# Patient Record
Sex: FEMALE | Race: ASIAN | NOT HISPANIC OR LATINO | URBAN - METROPOLITAN AREA
[De-identification: names, ages, dates, MRNs, and addresses within clinical notes are randomized per-mention and may not be internally consistent; named-entity substitution may affect disease eponyms.]

---

## 2021-01-11 ENCOUNTER — OUTPATIENT (OUTPATIENT)
Dept: OUTPATIENT SERVICES | Facility: HOSPITAL | Age: 61
LOS: 1 days | End: 2021-01-11
Payer: COMMERCIAL

## 2021-01-11 DIAGNOSIS — Z20.828 CONTACT WITH AND (SUSPECTED) EXPOSURE TO OTHER VIRAL COMMUNICABLE DISEASES: ICD-10-CM

## 2021-01-11 LAB — SARS-COV-2 RNA SPEC QL NAA+PROBE: DETECTED

## 2021-01-11 PROCEDURE — U0003: CPT

## 2021-01-11 PROCEDURE — U0005: CPT

## 2021-01-11 PROCEDURE — C9803: CPT

## 2021-01-12 DIAGNOSIS — Z20.828 CONTACT WITH AND (SUSPECTED) EXPOSURE TO OTHER VIRAL COMMUNICABLE DISEASES: ICD-10-CM

## 2021-04-05 ENCOUNTER — OUTPATIENT (OUTPATIENT)
Dept: OUTPATIENT SERVICES | Facility: HOSPITAL | Age: 61
LOS: 1 days | End: 2021-04-05
Payer: COMMERCIAL

## 2021-04-05 DIAGNOSIS — Z20.828 CONTACT WITH AND (SUSPECTED) EXPOSURE TO OTHER VIRAL COMMUNICABLE DISEASES: ICD-10-CM

## 2021-04-05 LAB — SARS-COV-2 RNA SPEC QL NAA+PROBE: SIGNIFICANT CHANGE UP

## 2021-04-05 PROCEDURE — C9803: CPT

## 2021-04-05 PROCEDURE — U0003: CPT

## 2021-04-05 PROCEDURE — U0005: CPT

## 2021-04-06 DIAGNOSIS — Z20.828 CONTACT WITH AND (SUSPECTED) EXPOSURE TO OTHER VIRAL COMMUNICABLE DISEASES: ICD-10-CM

## 2021-05-01 ENCOUNTER — OUTPATIENT (OUTPATIENT)
Dept: OUTPATIENT SERVICES | Facility: HOSPITAL | Age: 61
LOS: 1 days | End: 2021-05-01
Payer: COMMERCIAL

## 2021-05-01 DIAGNOSIS — Z20.828 CONTACT WITH AND (SUSPECTED) EXPOSURE TO OTHER VIRAL COMMUNICABLE DISEASES: ICD-10-CM

## 2021-05-01 LAB — SARS-COV-2 RNA SPEC QL NAA+PROBE: SIGNIFICANT CHANGE UP

## 2021-05-01 PROCEDURE — U0003: CPT

## 2021-05-01 PROCEDURE — U0005: CPT

## 2021-05-01 PROCEDURE — C9803: CPT

## 2021-05-02 DIAGNOSIS — Z20.828 CONTACT WITH AND (SUSPECTED) EXPOSURE TO OTHER VIRAL COMMUNICABLE DISEASES: ICD-10-CM

## 2021-07-14 PROBLEM — Z00.00 ENCOUNTER FOR PREVENTIVE HEALTH EXAMINATION: Status: ACTIVE | Noted: 2021-07-14

## 2021-07-15 ENCOUNTER — APPOINTMENT (OUTPATIENT)
Dept: ORTHOPEDIC SURGERY | Facility: CLINIC | Age: 61
End: 2021-07-15
Payer: COMMERCIAL

## 2021-07-15 ENCOUNTER — NON-APPOINTMENT (OUTPATIENT)
Age: 61
End: 2021-07-15

## 2021-07-15 DIAGNOSIS — M65.4 RADIAL STYLOID TENOSYNOVITIS [DE QUERVAIN]: ICD-10-CM

## 2021-07-15 DIAGNOSIS — M25.512 PAIN IN LEFT SHOULDER: ICD-10-CM

## 2021-07-15 PROCEDURE — 73030 X-RAY EXAM OF SHOULDER: CPT | Mod: LT

## 2021-07-15 PROCEDURE — 99203 OFFICE O/P NEW LOW 30 MIN: CPT

## 2021-07-15 NOTE — END OF VISIT
[FreeTextEntry3] : All medical record entries made by the Scribe were at my,  Dr. Misael Knapp MD., direction and personally dictated by me on 07/15/2021. I have personally reviewed the chart and agree that the record accurately reflects my personal performance of the history, physical exam, assessment and plan.\par \par

## 2021-07-15 NOTE — DISCUSSION/SUMMARY
[FreeTextEntry1] : The underlying pathophysiology was reviewed with the patient. XR films were reviewed with the patient. Discussed at length the nature of the patient’s condition. The left wrist symptoms appear secondary to De Quervain's tendonitis.\par \par The patient wishes to proceed with physical therapy of the left shoulder and left wrist. A script was given.		 \par Continue with use of wrist brace for De Quervain's\par Continue with use of antiinflammatories such as Naproxen\par A cortisone injection into the left wrist is not indicated at this time due to the patient receiving a cortisone injection 2 weeks ago.\par \par Patient can continue activities as tolerated. All questions answered, understanding verbalized. Patient in agreement with plan of care. Follow up in 6-8 weeks.

## 2021-07-15 NOTE — PHYSICAL EXAM
[de-identified] : Patient is WDWN, alert, and in no acute distress. Breathing is unlabored. She is grossly oriented to person, place, \par and time.\par \par She is accompanied by her son today.\par \par Left Wrist: \par There is tenderness over the first dorsal compartment at the level of the radial styloid. Swelling is localized to the area. There are no visible deformities. The ROM is full in all planes with no crepitus. There is no joint instability on provocative testing. Sensation is normal.\par Range of Motion: Pain is elicited with resisted radial deviation of the wrist. \par Tests/Signs: Finkelstein's test is positive.\par \par Left Shoulder: \par Inspection/ Palpation: there is no tenderness, swelling, or deformities. \par Range of Motion: active flexion, passive flexion, abduction, external rotation is full with discomfort\par Stability: no joint instability on provocative testing. 		 		  [de-identified] : AP, transcapula, and axillary views of the left shoulder were obtained and revealed no abnormalities. No acute fracture. No dislocation. Cartilage spaces are maintained. \par \par XRs of the left wrist were reviewed from April 2021 - Mild arthritis is present.

## 2021-07-15 NOTE — ADDENDUM
[FreeTextEntry1] : I, Adali Castillo wrote this note acting as a scribe for Dr. Misael Knapp on Jul 15, 2021.\par \par

## 2021-07-15 NOTE — HISTORY OF PRESENT ILLNESS
[Right] : right hand dominant [FreeTextEntry1] : Pt is a 61 y/o female c/o left wrist and left shoulder pain.  The left wrist has been painful for 4 months.  Denies injury.  She has pain with lifting or twisting.  She cannot open a jar without pain.  It is tender to touch.  She has swelling in the radial portion of the wrist.  She was seen by another orthopedist two weeks ago who gave her a cortisone injection.  She states that the pain is 60% better.  She is wearing a thumb spica brace which helps a little. She also notes that she had an xray by a physician in [place] which was WNL.\par \par She also c/o left shoulder pain x 4 months.  She has pain with overhead motion.  It is painful to lay on her side.  She has difficult lifting anything heavy.  She has not been seen by anyone for the shoulder pain.

## 2022-01-08 ENCOUNTER — OUTPATIENT (OUTPATIENT)
Dept: OUTPATIENT SERVICES | Facility: HOSPITAL | Age: 62
LOS: 1 days | End: 2022-01-08
Payer: COMMERCIAL

## 2022-01-08 DIAGNOSIS — Z20.828 CONTACT WITH AND (SUSPECTED) EXPOSURE TO OTHER VIRAL COMMUNICABLE DISEASES: ICD-10-CM

## 2022-01-08 LAB — SARS-COV-2 RNA SPEC QL NAA+PROBE: SIGNIFICANT CHANGE UP

## 2022-01-08 PROCEDURE — U0003: CPT

## 2022-01-08 PROCEDURE — U0005: CPT

## 2022-01-08 PROCEDURE — C9803: CPT

## 2022-01-09 DIAGNOSIS — Z20.828 CONTACT WITH AND (SUSPECTED) EXPOSURE TO OTHER VIRAL COMMUNICABLE DISEASES: ICD-10-CM

## 2023-05-25 ENCOUNTER — NON-APPOINTMENT (OUTPATIENT)
Age: 63
End: 2023-05-25

## 2023-05-25 ENCOUNTER — APPOINTMENT (OUTPATIENT)
Dept: ORTHOPEDIC SURGERY | Facility: CLINIC | Age: 63
End: 2023-05-25
Payer: COMMERCIAL

## 2023-05-25 VITALS — BODY MASS INDEX: 22.66 KG/M2 | HEIGHT: 61 IN | WEIGHT: 120 LBS

## 2023-05-25 DIAGNOSIS — M19.072 PRIMARY OSTEOARTHRITIS, LEFT ANKLE AND FOOT: ICD-10-CM

## 2023-05-25 DIAGNOSIS — M25.572 PAIN IN LEFT ANKLE AND JOINTS OF LEFT FOOT: ICD-10-CM

## 2023-05-25 PROCEDURE — 99214 OFFICE O/P EST MOD 30 MIN: CPT

## 2023-05-25 PROCEDURE — 73610 X-RAY EXAM OF ANKLE: CPT | Mod: LT

## 2023-05-25 NOTE — DISCUSSION/SUMMARY
[de-identified] : Today I had a lengthy discussion with the patient regarding their left ankle pain. I have addressed all the patient's concerns surrounding the pathology of their condition. I have reviewed the patient's XR imaging with them in great detail. \par \par I recommend the patient undergo a course of physical therapy for the left ankle  2-3 times a week for a total of 8-12 weeks. A prescription was given for the physical therapy today. I also recommend the patient use an OTC ankle sleeve. I recommend that the patient utilize Voltaren gel topically. If the Voltaren gel could not be obtained, Icy Hot, Biofreeze, or Bengay can be utilized instead. \par \par The patient understood and verbally agreed to the treatment plan. All of their questions were answered and they were satisfied with the visit. The patient should call the office if they have any questions or experience worsening symptoms. \par \par Follow up in 2-3 months.

## 2023-05-25 NOTE — PHYSICAL EXAM
[de-identified] : General: Alert and oriented x3. In no acute distress. Pleasant in nature with a normal affect. No apparent respiratory distress.\par \par Left Ankle Exam\par \par Skin: Clean, dry, intact\par Inspection: No obvious malalignment, no swelling, no effusion; no lymphadenopathy\par Pulses: 2+ DP/PT pulses\par ROM: Left Ankle 10 degrees of dorsiflexion, 40 degrees of plantarflexion, 35 degrees of Eversion, 35 degrees of Inversion; 10 degrees of subtalar motion.\par Tenderness: + Talonavicular Joint. No tenderness over the lateral malleolus, no CFL/no ATFL/no PTFL pain. No medial malleolus pain, no deltoid ligament pain. No proximal fibular pain. No heel pain.\par Stability: Negative anterior/posterior drawer.\par Strength: 5/5 TA/GS/EHL\par Neuro: In tact to light touch throughout\par Additional tests: Negative Mortons test, Negative syndesmosis squeeze test. Negative Hugh's Sign.\par  [de-identified] : 3 views x-rays left ankle reviewed, 5/25/2023: arthritis, talonavicular arthritis.

## 2023-05-25 NOTE — HISTORY OF PRESENT ILLNESS
[FreeTextEntry1] : Adrien Dale is a 62 year old female who presents to the office for an initial evaluation of her left ankle. She is accompanied by her . She states that she has been experiencing symptoms for the past 5 months. There was no trauma to the area. The patient states that her symptoms increase when she is on her feet for prolonged periods of time. She endorses anterior ankle pain. She presents today wearing an ankle brace with sneakers and is ambulating without assistance. No other complaints.

## 2023-05-25 NOTE — ADDENDUM
[FreeTextEntry1] : I, LEENA STEIN, acted solely as a scribe for Dr. Efren Best on this date 05/25/2023  .\par  \par All medical record entries made by the Scribe were at my, Dr. Efren Best, direction and personally dictated by me on 05/25/2023 . I have reviewed the chart and agree that the record accurately reflects my personal performance of the history, physical exam, assessment and plan. I have also personally directed, reviewed, and agreed with the chart.

## 2023-07-31 ENCOUNTER — APPOINTMENT (OUTPATIENT)
Dept: ORTHOPEDIC SURGERY | Facility: CLINIC | Age: 63
End: 2023-07-31

## 2024-05-31 ENCOUNTER — APPOINTMENT (OUTPATIENT)
Dept: ORTHOPEDIC SURGERY | Facility: CLINIC | Age: 64
End: 2024-05-31
Payer: COMMERCIAL

## 2024-05-31 VITALS
BODY MASS INDEX: 22.66 KG/M2 | DIASTOLIC BLOOD PRESSURE: 81 MMHG | SYSTOLIC BLOOD PRESSURE: 147 MMHG | WEIGHT: 120 LBS | HEIGHT: 61 IN | HEART RATE: 81 BPM

## 2024-05-31 DIAGNOSIS — M25.552 PAIN IN LEFT HIP: ICD-10-CM

## 2024-05-31 DIAGNOSIS — M25.561 PAIN IN RIGHT KNEE: ICD-10-CM

## 2024-05-31 PROCEDURE — 99204 OFFICE O/P NEW MOD 45 MIN: CPT

## 2024-05-31 PROCEDURE — 72100 X-RAY EXAM L-S SPINE 2/3 VWS: CPT

## 2024-05-31 PROCEDURE — 73564 X-RAY EXAM KNEE 4 OR MORE: CPT | Mod: RT

## 2024-05-31 PROCEDURE — 72170 X-RAY EXAM OF PELVIS: CPT

## 2024-05-31 NOTE — DISCUSSION/SUMMARY
[de-identified] : Lumbar radiculopathy, right knee severe osteoarthritis  Extensive discussion of the natural history of this disease was had with the patient.  We discussed the treatment options ranging from conservative therapy which includes anti-inflammatories, steroid/HA injections, physical therapy, weight loss, knee sleeves/braces, and activity modification.  We did discuss that the ultimate treatment for arthritis is a joint replacement.  However at this time we have decided to continue with conservative treatment.   We did discuss red flag symptoms and patient should report immediately to the emergency department if she experiences any of these. I would like an MRI of her lumbar spine as this has been a chronic problem and has failed conservative treatment.  She will follow-up with PM&R once the MRI is complete. We did discuss injections for the right knee however she would like to focus on the back at this time. Patient will follow-up as needed if the pain returns or does not improve.  The patient's current medication management of their orthopedic diagnosis was reviewed today: (1) We discussed a comprehensive treatment plan that included possible pharmaceutical management involving the use of prescription strength medications including but not limited to options such as oral Ibuprofen 400mg QID, once daily Meloxicam 15 mg, or 500-650 mg Tylenol versus over the counter oral medications and topical prescription NSAID Pennsaid vs over the counter Voltaren gel. (2) There is a moderate risk of morbidity with further treatment, especially from use of prescription strength medications and possible side effects of these medications which include upset stomach with oral medications, skin reactions to topical medications and cardiac/renal issues with long term use. (3) I recommended that the patient follow-up with their medical physician to discuss any significant specific potential issues with long term medication use such as interactions with current medications or with exacerbation of underlying medical comorbidities. (4) The benefits and risks associated with use of injectable, oral or topical, prescription and over the counter anti-inflammatory medications were discussed with the patient. The patient voiced understanding of the risks including but not limited to bleeding, stroke, kidney dysfunction, heart disease, and were referred to the black box warning label for further information.  Stage 1 pressure injury on the back  - Likely 2/2 immobility  - PT consult

## 2024-05-31 NOTE — PHYSICAL EXAM
[de-identified] : Lumbar spine Palpation: Nontender palpation Motor: 5/5 L2-S1 Sensory: 2/2 L2-S1 Straight leg raise: Mildly positive on right Clonus: < 5 beats  Right lower extremity Hip: Normal ROM without pain on IR/ER Knee Inspection: no effusion Wounds: None Alignment: Neutral Palpation: tender to palpation at medial joint line ROM active (in degrees): 0-140 with crepitus/pain through the arc of motion Ligamentous laxity: all ligaments appear stable, stable to varus stress test, stable to valgus stress test Meniscal Test: Positive McMurrays, positive Hardik. Muscle Test: good quad strength. [de-identified] : 5/31/24: Right knee xrays, standing AP/Lateral and Merchant films, and 45 degree PA standing view are reviewed and demonstrate diffuse tricompartmental degenerative arthritis, medial and lateral joint space narrowing, marginal osteophytes, bone on bone with sclerosis, patellofemoral joint space narrowing AP pelvis-joint space well-preserved AP lateral lumbar spine-loss of lumbar lordosis

## 2024-05-31 NOTE — HISTORY OF PRESENT ILLNESS
[de-identified] : 5/31/24: Patient here for bilateral lower extremity radiculopathy and right knee pain.  His right knee has been bothering her on and off for few years.  Left lower extremity has had pain radiating down it for couple years, right lower extremity over the last couple months.  Has numbness in the lower extremities at times.  Denies red flag symptoms.  Taking Tylenol for pain occasionally.  Feels Advil and Celebrex did not help.  Exercises and has done physical therapy as well as massage therapy both here and in [place].  Did try Medrol Dosepak but did not have much relief.  Denies allergies or past medical history.   is Melvi Dale.

## 2024-06-10 ENCOUNTER — APPOINTMENT (OUTPATIENT)
Dept: MRI IMAGING | Facility: CLINIC | Age: 64
End: 2024-06-10

## 2024-06-10 ENCOUNTER — OUTPATIENT (OUTPATIENT)
Dept: OUTPATIENT SERVICES | Facility: HOSPITAL | Age: 64
LOS: 1 days | End: 2024-06-10
Payer: COMMERCIAL

## 2024-06-10 DIAGNOSIS — M54.16 RADICULOPATHY, LUMBAR REGION: ICD-10-CM

## 2024-06-10 PROCEDURE — 72148 MRI LUMBAR SPINE W/O DYE: CPT

## 2024-06-10 PROCEDURE — 72148 MRI LUMBAR SPINE W/O DYE: CPT | Mod: 26

## 2024-06-12 ENCOUNTER — APPOINTMENT (OUTPATIENT)
Dept: PHYSICAL MEDICINE AND REHAB | Facility: CLINIC | Age: 64
End: 2024-06-12
Payer: COMMERCIAL

## 2024-06-12 VITALS
DIASTOLIC BLOOD PRESSURE: 88 MMHG | BODY MASS INDEX: 22.66 KG/M2 | SYSTOLIC BLOOD PRESSURE: 160 MMHG | WEIGHT: 120 LBS | HEIGHT: 61 IN | HEART RATE: 88 BPM | OXYGEN SATURATION: 98 %

## 2024-06-12 DIAGNOSIS — M54.16 RADICULOPATHY, LUMBAR REGION: ICD-10-CM

## 2024-06-12 DIAGNOSIS — M53.3 SACROCOCCYGEAL DISORDERS, NOT ELSEWHERE CLASSIFIED: ICD-10-CM

## 2024-06-12 PROCEDURE — 99203 OFFICE O/P NEW LOW 30 MIN: CPT

## 2024-06-12 RX ORDER — DICLOFENAC SODIUM 1% 10 MG/G
1 GEL TOPICAL
Qty: 1 | Refills: 3 | Status: ACTIVE | COMMUNITY
Start: 2024-06-12 | End: 1900-01-01

## 2024-06-12 NOTE — HISTORY OF PRESENT ILLNESS
[FreeTextEntry1] : 63 year old female presents with right low back pain for 2 months She may have strained her back lifting her dad 2 months ago  back pain Pain:  5/10 Worse: 10/10 Quality: sharp  Frequency: constant The pain starts in the right low back and radiates to the right foot. She has no pain when she first wakes in the morning Low back pain becomes aggravated after standing for 1 hour x 4 hours she has to sit. No B/B difficulties No leg weakness  NSAIDs upset her stomach.  She has only been using Tylenol 325 mg 1 tab daily  She recently had an MRI of the lumbar spine performed.  The MRI has not yet been read

## 2024-06-12 NOTE — PHYSICAL EXAM
[FreeTextEntry1] : Pleasant, in no distress. Language: English HEENT: Head: no trauma. Eyes: no discharge. Ears: No discharge. Nose No discharge. Throat: clear Neck: FAROM. Negative Spurlings Heart: RR, +S1, S2 Lungs: CTA Abdomen: soft, NT Lumbar spine: FAROM, mild spasm in the right paraspinal and tender over the right SI joint.  LUE: Shoulder:FAROM, MS 5/5 Elbow: FAROM, MS 5/5 reflexes 2/4 Wrist: FAROM, MS 5/5 reflexes 2/4 Warm, nontender, pulse 2+  RUE:Shoulder:FAROM, MS 5/5 Elbow: FAROM, MS 5/5 reflexes 2/4 Wrist: FAROM, MS 5/5 reflexes 2/4 Warm, nontender, pulse 2+  LLE: Hip: FAROM, MS 5/5 Knee: FAROM, MS 5/5 reflexes 2/4 Ankle: FAROM, MS 5/5 reflexes 2/4 Warm , nontender, pulse 2+ negative homans  RLE: Hip: FAROM, MS 5/5 Knee: FAROM, MS 5/5 reflexes 2/4 Ankle: FAROM, MS 5/5 reflexes 2/4 Warm , nontender, pulse 2+ negative homans  Gait: Spontaneous, reciprocal, safe without an assistive device  Sensation RUE: sensation is intact to light touch, pinprick  and proprioception LUE: sensation is intact to light touch, pinprick  and proprioception RLE: sensation is intact to light touch, pinprick  and proprioception. + SLR. with calf pain.  Neg GREG, Neg SARY LLE: sensation is intact to light touch, pinprick  and proprioception. Neg SLR. Neg GREG, Neg SARY

## 2024-06-24 ENCOUNTER — APPOINTMENT (OUTPATIENT)
Dept: PHYSICAL MEDICINE AND REHAB | Facility: CLINIC | Age: 64
End: 2024-06-24

## 2024-07-16 ENCOUNTER — APPOINTMENT (OUTPATIENT)
Dept: ORTHOPEDIC SURGERY | Facility: CLINIC | Age: 64
End: 2024-07-16
Payer: COMMERCIAL

## 2024-07-16 DIAGNOSIS — M17.11 UNILATERAL PRIMARY OSTEOARTHRITIS, RIGHT KNEE: ICD-10-CM

## 2024-07-16 PROCEDURE — 20610 DRAIN/INJ JOINT/BURSA W/O US: CPT | Mod: RT

## 2024-07-16 PROCEDURE — 99214 OFFICE O/P EST MOD 30 MIN: CPT | Mod: 25

## 2024-07-22 ENCOUNTER — APPOINTMENT (OUTPATIENT)
Dept: PHYSICAL MEDICINE AND REHAB | Facility: CLINIC | Age: 64
End: 2024-07-22
Payer: COMMERCIAL

## 2024-07-22 VITALS
RESPIRATION RATE: 15 BRPM | HEART RATE: 74 BPM | BODY MASS INDEX: 22.28 KG/M2 | DIASTOLIC BLOOD PRESSURE: 62 MMHG | HEIGHT: 61 IN | SYSTOLIC BLOOD PRESSURE: 96 MMHG | WEIGHT: 118 LBS

## 2024-07-22 DIAGNOSIS — M48.061 SPINAL STENOSIS, LUMBAR REGION WITHOUT NEUROGENIC CLAUDICATION: ICD-10-CM

## 2024-07-22 PROCEDURE — G2211 COMPLEX E/M VISIT ADD ON: CPT

## 2024-07-22 PROCEDURE — 99215 OFFICE O/P EST HI 40 MIN: CPT

## 2024-07-22 RX ORDER — GABAPENTIN 100 MG/1
100 CAPSULE ORAL
Qty: 90 | Refills: 1 | Status: ACTIVE | COMMUNITY
Start: 2024-07-22 | End: 1900-01-01

## 2024-07-22 RX ORDER — CELECOXIB 200 MG/1
200 CAPSULE ORAL
Refills: 0 | Status: ACTIVE | COMMUNITY

## 2024-07-22 NOTE — PHYSICAL EXAM
[FreeTextEntry1] : PE: Constitutional: In NAD, calm and cooperative MSK (Back)  Inspection: no gross swelling identified  Palpation: Tenderness of the bilateral lower lumbar paraspinals  ROM: Pain at end lumbar extension/flexion  Strength: 5/5 strength in bilateral lower extremities  Reflexes: 2+ Patella reflex bilaterally, 2+ Achilles reflex bilaterally, negative clonus bilaterally  Sensation: Intact to light touch in bilateral lower extremities Special tests: SLR: negative bilaterally GREG: negative bilaterally FADIR: negative bilaterally Facet loading: positive bilaterally

## 2024-07-22 NOTE — DATA REVIEWED
[FreeTextEntry1] : MR L Spine 6/10/24 reviewed and interpreted by me: multilevel spondylosis seen, mod-severe canal narrowing at L4-5 seen  EXAM: [id number] - MR SPINE LUMBAR  - ORDERED BY:  OTONIEL YOO   PROCEDURE DATE:  06/10/2024    INTERPRETATION:  CLINICAL INFORMATION: Radiculopathy C2 the bilateral lower extremities  ADDITIONAL CLINICAL INFORMATION: Low back muscle strain S39.012A  TECHNIQUE: Multiplanar, multisequence MRI was performed of the lumbar spine. IV Contrast: NONE  PRIOR STUDIES: No priors available for comparison.  FINDINGS:  LOCALIZER: No additional findings. BONES: There is no fracture or bone marrow edema. ALIGNMENT: Minimal retrolisthesis of L4 and L5. SACROILIAC JOINTS/SACRUM: There is no sacral fracture. The SI joints are partially visualized but are intact. CONUS AND CAUDA EQUINA: The distal cord and conus are normal in signal. Conus terminates at L2. VISUALIZED INTRAPELVIC/INTRA-ABDOMINAL SOFT TISSUES: Normal. PARASPINAL SOFT TISSUES: Normal.   INDIVIDUAL LEVELS: Disc desiccation throughout the lumbar spine.  LOWER THORACIC SPINE: No spinal canal or neuroforaminal stenosis.  L1-L2: Diffuse disc bulge with mild to moderate bilateral facet arthrosis and thickening of ligamentum flavum. These findings contribute to minimal bilateral foraminal narrowing and minimal central canal narrowing. L2-L3: Diffuse disc bulge with mild to moderate bilateral facet arthrosis and thickening of the ligamentum flavum. These findings contribute to minimal bilateral foraminal narrowing. No significant central canal narrowing. L3-L4: Diffuse disc bulge with moderate bilateral facet arthrosis contributing to minimal left foraminal narrowing. No significant right foraminal or central canal narrowing. L4-L5: Diffuse disc bulge abutting the ventral cord. There is moderate bilateral facet arthrosis with thickening of the ligamentum flavum. These findings contribute to mild to moderate bilateral foraminal and moderate to severe central canal narrowing. L5-S1: Diffuse disc bulge with moderate to severe bilateral facet arthrosis with thickening of ligamentum flavum. There is marked narrowing of the bilateral lateral recesses and minimal bilateral foraminal narrowing with moderate central canal narrowing.   IMPRESSION:  Multilevel degenerative changes of the lumbar spine with up to mild to moderate foraminal and moderate to severe central canal narrowing.  --- End of Report ---       STEPH PICHARDO DO; Attending Radiologist This document has been electronically signed. Jun 12 2024  1:30PM

## 2024-07-22 NOTE — ASSESSMENT
[FreeTextEntry1] : Ms. MICHAEL CARREON is a 63 year old female who presents with chronic low back pain, worsening, likely related to her underlying spinal stenosis. Denies any red flag signs. Will recommend: - MRI L Spine reviewed - Will start trial of Gabapentin 100mg Qhs with gradual increase to 300mg Qhs. Patient aware of side effects and risks including sedation, leg swelling, and possible mood changes.  - Discussed with patient the risks (including but not limited to bleeding, elevated blood sugar, allergic reaction, infection, nerve damage, etc), benefits and alternatives to an epidural steroid injection for which patient understands and would like to proceed. Will plan on a right L4 TFESI.  - Restart PT 2-3x/week for stretching, strengthening (especially of core muscles), ROM exercises, HEP and modalities PRN including myofascial release, moist heat   Return for procedure. Patient aware of red flag signs including any changes to their bowel/bladder control, groin numbness or new weakness. Patient knows to seek immediate attention by calling 911 or going to nearest ER if these symptoms appear.   This patient is being managed for a complex chronic pain that requires ongoing medical management. The nature of this condition requires a longitudinal relationship and monitoring over time for appropriate treatment.   I have personally spent 40 minutes of time today on the encounter excluding any separately billed tests or procedures. This work included: reviewing prior records, obtaining and reviewing history, performing exam, counseling patient, ordering tests or procedures, documentation and care coordination

## 2024-07-22 NOTE — HISTORY OF PRESENT ILLNESS
[FreeTextEntry1] : Ms. MICHAEL CARREON is a 63year old female who presents with low back pain.   Location: L>R low back Onset: Started around 4/2024, was lifting father at that time and felt immediate pain in her back Provocation/Palliative: Worse with activity, better with rest Quality: Shooting Radiation: Down RLE in L4/5 fashion Severity: Moderate to severe Timing: Worsening over time   Denies any associated numbness. Denies any associated leg weakness. Denies any loss of bowel/bladder control or any groin numbness. Previous medications trialed: Tylenol Previous procedures relevant to complaint: None Conservative therapy tried?: PT x 2 months, acupuncture, chiropractic with some relief  History assisted by patient's son Speedy as per her request

## 2024-07-24 ENCOUNTER — APPOINTMENT (OUTPATIENT)
Dept: PHYSICAL MEDICINE AND REHAB | Facility: CLINIC | Age: 64
End: 2024-07-24

## 2024-08-05 ENCOUNTER — OUTPATIENT (OUTPATIENT)
Dept: OUTPATIENT SERVICES | Facility: HOSPITAL | Age: 64
LOS: 1 days | End: 2024-08-05
Payer: COMMERCIAL

## 2024-08-05 ENCOUNTER — APPOINTMENT (OUTPATIENT)
Dept: RADIOLOGY | Facility: CLINIC | Age: 64
End: 2024-08-05

## 2024-08-05 DIAGNOSIS — Z00.8 ENCOUNTER FOR OTHER GENERAL EXAMINATION: ICD-10-CM

## 2024-08-05 PROCEDURE — 71046 X-RAY EXAM CHEST 2 VIEWS: CPT

## 2024-08-05 PROCEDURE — 71046 X-RAY EXAM CHEST 2 VIEWS: CPT | Mod: 26

## 2024-08-16 ENCOUNTER — APPOINTMENT (OUTPATIENT)
Dept: PHYSICAL MEDICINE AND REHAB | Facility: GI CENTER | Age: 64
End: 2024-08-16

## 2024-10-25 ENCOUNTER — APPOINTMENT (OUTPATIENT)
Dept: OPHTHALMOLOGY | Facility: CLINIC | Age: 64
End: 2024-10-25

## 2025-05-13 DIAGNOSIS — M54.16 RADICULOPATHY, LUMBAR REGION: ICD-10-CM

## 2025-09-08 ENCOUNTER — NON-APPOINTMENT (OUTPATIENT)
Age: 65
End: 2025-09-08

## 2025-09-08 ENCOUNTER — APPOINTMENT (OUTPATIENT)
Dept: INTERNAL MEDICINE | Facility: CLINIC | Age: 65
End: 2025-09-08
Payer: COMMERCIAL

## 2025-09-08 VITALS
BODY MASS INDEX: 19.63 KG/M2 | DIASTOLIC BLOOD PRESSURE: 80 MMHG | SYSTOLIC BLOOD PRESSURE: 122 MMHG | OXYGEN SATURATION: 98 % | WEIGHT: 104 LBS | HEART RATE: 76 BPM | HEIGHT: 61 IN | TEMPERATURE: 97.7 F | RESPIRATION RATE: 14 BRPM

## 2025-09-08 DIAGNOSIS — Z23 ENCOUNTER FOR IMMUNIZATION: ICD-10-CM

## 2025-09-08 DIAGNOSIS — Z82.49 FAMILY HISTORY OF ISCHEMIC HEART DISEASE AND OTHER DISEASES OF THE CIRCULATORY SYSTEM: ICD-10-CM

## 2025-09-08 DIAGNOSIS — E53.8 DEFICIENCY OF OTHER SPECIFIED B GROUP VITAMINS: ICD-10-CM

## 2025-09-08 DIAGNOSIS — Z00.00 ENCOUNTER FOR GENERAL ADULT MEDICAL EXAMINATION W/OUT ABNORMAL FINDINGS: ICD-10-CM

## 2025-09-08 PROCEDURE — 99386 PREV VISIT NEW AGE 40-64: CPT | Mod: 25

## 2025-09-08 PROCEDURE — 90656 IIV3 VACC NO PRSV 0.5 ML IM: CPT

## 2025-09-08 PROCEDURE — G0008: CPT

## 2025-09-10 LAB
ALBUMIN SERPL ELPH-MCNC: 4.4 G/DL
ALP BLD-CCNC: 70 U/L
ALT SERPL-CCNC: 18 U/L
ANION GAP SERPL CALC-SCNC: 12 MMOL/L
AST SERPL-CCNC: 22 U/L
BASOPHILS # BLD AUTO: 0.04 K/UL
BASOPHILS NFR BLD AUTO: 0.9 %
BILIRUB SERPL-MCNC: 0.3 MG/DL
BUN SERPL-MCNC: 9 MG/DL
CALCIUM SERPL-MCNC: 9.4 MG/DL
CHLORIDE SERPL-SCNC: 109 MMOL/L
CHOLEST SERPL-MCNC: 190 MG/DL
CO2 SERPL-SCNC: 24 MMOL/L
CREAT SERPL-MCNC: 0.72 MG/DL
EGFRCR SERPLBLD CKD-EPI 2021: 94 ML/MIN/1.73M2
EOSINOPHIL # BLD AUTO: 0.16 K/UL
EOSINOPHIL NFR BLD AUTO: 3.6 %
ESTIMATED AVERAGE GLUCOSE: 114 MG/DL
FOLATE SERPL-MCNC: >20 NG/ML
GLUCOSE SERPL-MCNC: 94 MG/DL
HBA1C MFR BLD HPLC: 5.6 %
HCT VFR BLD CALC: 40 %
HDLC SERPL-MCNC: 67 MG/DL
HGB BLD-MCNC: 13.2 G/DL
IMM GRANULOCYTES NFR BLD AUTO: 0.5 %
LDLC SERPL-MCNC: 113 MG/DL
LYMPHOCYTES # BLD AUTO: 1.32 K/UL
LYMPHOCYTES NFR BLD AUTO: 29.9 %
MAN DIFF?: NORMAL
MCHC RBC-ENTMCNC: 31.1 PG
MCHC RBC-ENTMCNC: 33 G/DL
MCV RBC AUTO: 94.3 FL
MONOCYTES # BLD AUTO: 0.38 K/UL
MONOCYTES NFR BLD AUTO: 8.6 %
NEUTROPHILS # BLD AUTO: 2.49 K/UL
NEUTROPHILS NFR BLD AUTO: 56.5 %
NONHDLC SERPL-MCNC: 123 MG/DL
PLATELET # BLD AUTO: 185 K/UL
POTASSIUM SERPL-SCNC: 4.7 MMOL/L
PROT SERPL-MCNC: 6.5 G/DL
RBC # BLD: 4.24 M/UL
RBC # FLD: 12.4 %
SODIUM SERPL-SCNC: 145 MMOL/L
TRIGL SERPL-MCNC: 52 MG/DL
TSH SERPL-ACNC: 2.86 UIU/ML
VIT B12 SERPL-MCNC: 596 PG/ML
WBC # FLD AUTO: 4.41 K/UL